# Patient Record
Sex: FEMALE | Race: BLACK OR AFRICAN AMERICAN | NOT HISPANIC OR LATINO | URBAN - METROPOLITAN AREA
[De-identification: names, ages, dates, MRNs, and addresses within clinical notes are randomized per-mention and may not be internally consistent; named-entity substitution may affect disease eponyms.]

---

## 2018-06-10 ENCOUNTER — EMERGENCY (EMERGENCY)
Facility: HOSPITAL | Age: 33
LOS: 1 days | Discharge: HOME | End: 2018-06-10
Attending: EMERGENCY MEDICINE | Admitting: EMERGENCY MEDICINE
Payer: COMMERCIAL

## 2018-06-10 VITALS
TEMPERATURE: 98 F | RESPIRATION RATE: 17 BRPM | HEART RATE: 80 BPM | SYSTOLIC BLOOD PRESSURE: 128 MMHG | OXYGEN SATURATION: 98 % | DIASTOLIC BLOOD PRESSURE: 80 MMHG

## 2018-06-10 PROCEDURE — 74019 RADEX ABDOMEN 2 VIEWS: CPT

## 2018-06-10 PROCEDURE — 99284 EMERGENCY DEPT VISIT MOD MDM: CPT

## 2018-06-10 PROCEDURE — 74019 RADEX ABDOMEN 2 VIEWS: CPT | Mod: 26

## 2018-06-10 RX ORDER — DOCUSATE SODIUM 100 MG
1 CAPSULE ORAL
Qty: 60 | Refills: 0 | OUTPATIENT
Start: 2018-06-10 | End: 2018-07-09

## 2018-06-10 NOTE — ED PROVIDER NOTE - GASTROINTESTINAL, MLM
Abd soft, NT, obese, NABS. No guarding, rebound, or rigidity. No pulsatile abdominal masses. No organomegaly appreciated. No CVAT

## 2018-06-10 NOTE — ED PROVIDER NOTE - MEDICAL DECISION MAKING DETAILS
Pt p/w episode of abdominal cramping w/ likely vasovagal episode and subsequent BM. Benign abd and  exam. No fecal impaction. Based on H&P, likely continued cramping associated w/ need for further BM. Check UCG. Abd XR.

## 2018-06-10 NOTE — ED PROVIDER NOTE - PROGRESS NOTE DETAILS
Pt feeling better. Constipation. Benign abd. Not pregnant. Will d/c w/ Rx for stool softener / laxative.

## 2018-06-10 NOTE — ED PROVIDER NOTE - PSYCHIATRIC, MLM
Alert and oriented to person, place, time/situation. normal mood and affect. no apparent risk to self or others. CT report reviewed

## 2018-06-10 NOTE — ED PROVIDER NOTE - DIAGNOSTIC INTERPRETATION
ABDOMINAL XRAY INTERPRETATION:  nonspecific gas pattern, no free air noted, no apparent hepatomegaly. stool in L colon

## 2018-06-10 NOTE — ED PROVIDER NOTE - OBJECTIVE STATEMENT
Pt w/ no sig PMHx p/w abdominal pain. The pain began while at work, located in the diffuse lower abdomen, and is associated w/ pain in the rectum. The pain is cramping, and at onset was severe. Pt began having nausea, sweating, and then had a large BM at work, with improvement in pain, and resolution of sweating and nausea. Pt reports usual constipation, stating she does not recall when she last had BM. Pt reports continued pain, although less, w/ still pain in the rectum. No vaginal bleeding or discharge. No V/D. Pt is fasting. No f/c. No F/U/D or hematuria. LMP . Pt is sexually active w/ 1 partner, no hx STDs. No dyspareunia. .

## 2018-06-10 NOTE — ED ADULT TRIAGE NOTE - CHIEF COMPLAINT QUOTE
biba c/o lower abdominal pain radiating to right flank that started 2 hours ago.  Denies other symptoms.  LMP 5/22/18.

## 2018-06-10 NOTE — ED ADULT NURSE NOTE - OBJECTIVE STATEMENT
Patient is a 31yo female reporting that today she had abdominal cramping with sweating and nausea, and then patient proceeded to have a bowel movement. Patient denies urinary symptoms, chest pain, shortness of breath, vomiting.

## 2018-06-14 DIAGNOSIS — R11.0 NAUSEA: ICD-10-CM

## 2018-06-14 DIAGNOSIS — R10.30 LOWER ABDOMINAL PAIN, UNSPECIFIED: ICD-10-CM

## 2018-06-14 DIAGNOSIS — K59.00 CONSTIPATION, UNSPECIFIED: ICD-10-CM

## 2023-05-08 NOTE — ED ADULT NURSE NOTE - NS ED NURSE LEVEL OF CONSCIOUSNESS SPEECH
Speaking Coherently [place] Counseling: I discussed with the patient the risks of tildrakizumab including but not limited to immunosuppression, malignancy, posterior leukoencephalopathy syndrome, and serious infections.  The patient understands that monitoring is required including a PPD at baseline and must alert us or the primary physician if symptoms of infection or other concerning signs are noted.